# Patient Record
Sex: FEMALE | Race: WHITE | NOT HISPANIC OR LATINO | Employment: UNEMPLOYED | URBAN - METROPOLITAN AREA
[De-identification: names, ages, dates, MRNs, and addresses within clinical notes are randomized per-mention and may not be internally consistent; named-entity substitution may affect disease eponyms.]

---

## 2023-01-30 ENCOUNTER — OFFICE VISIT (OUTPATIENT)
Dept: INTERNAL MEDICINE | Facility: CLINIC | Age: 38
End: 2023-01-30
Payer: COMMERCIAL

## 2023-01-30 VITALS
OXYGEN SATURATION: 98 % | HEART RATE: 87 BPM | HEIGHT: 60 IN | TEMPERATURE: 98 F | BODY MASS INDEX: 43.55 KG/M2 | RESPIRATION RATE: 20 BRPM | WEIGHT: 221.81 LBS | SYSTOLIC BLOOD PRESSURE: 110 MMHG | DIASTOLIC BLOOD PRESSURE: 60 MMHG

## 2023-01-30 DIAGNOSIS — E66.01 MORBID OBESITY WITH BMI OF 40.0-44.9, ADULT: ICD-10-CM

## 2023-01-30 DIAGNOSIS — Z00.00 ANNUAL PHYSICAL EXAM: Primary | ICD-10-CM

## 2023-01-30 PROCEDURE — 3078F PR MOST RECENT DIASTOLIC BLOOD PRESSURE < 80 MM HG: ICD-10-PCS | Mod: CPTII,S$GLB,, | Performed by: INTERNAL MEDICINE

## 2023-01-30 PROCEDURE — 1160F PR REVIEW ALL MEDS BY PRESCRIBER/CLIN PHARMACIST DOCUMENTED: ICD-10-PCS | Mod: CPTII,S$GLB,, | Performed by: INTERNAL MEDICINE

## 2023-01-30 PROCEDURE — 3078F DIAST BP <80 MM HG: CPT | Mod: CPTII,S$GLB,, | Performed by: INTERNAL MEDICINE

## 2023-01-30 PROCEDURE — 3008F PR BODY MASS INDEX (BMI) DOCUMENTED: ICD-10-PCS | Mod: CPTII,S$GLB,, | Performed by: INTERNAL MEDICINE

## 2023-01-30 PROCEDURE — 1159F MED LIST DOCD IN RCRD: CPT | Mod: CPTII,S$GLB,, | Performed by: INTERNAL MEDICINE

## 2023-01-30 PROCEDURE — 99385 PR PREVENTIVE VISIT,NEW,18-39: ICD-10-PCS | Mod: S$GLB,,, | Performed by: INTERNAL MEDICINE

## 2023-01-30 PROCEDURE — 99999 PR PBB SHADOW E&M-EST. PATIENT-LVL IV: CPT | Mod: PBBFAC,,, | Performed by: INTERNAL MEDICINE

## 2023-01-30 PROCEDURE — 3074F PR MOST RECENT SYSTOLIC BLOOD PRESSURE < 130 MM HG: ICD-10-PCS | Mod: CPTII,S$GLB,, | Performed by: INTERNAL MEDICINE

## 2023-01-30 PROCEDURE — 3008F BODY MASS INDEX DOCD: CPT | Mod: CPTII,S$GLB,, | Performed by: INTERNAL MEDICINE

## 2023-01-30 PROCEDURE — 1159F PR MEDICATION LIST DOCUMENTED IN MEDICAL RECORD: ICD-10-PCS | Mod: CPTII,S$GLB,, | Performed by: INTERNAL MEDICINE

## 2023-01-30 PROCEDURE — 1160F RVW MEDS BY RX/DR IN RCRD: CPT | Mod: CPTII,S$GLB,, | Performed by: INTERNAL MEDICINE

## 2023-01-30 PROCEDURE — 99999 PR PBB SHADOW E&M-EST. PATIENT-LVL IV: ICD-10-PCS | Mod: PBBFAC,,, | Performed by: INTERNAL MEDICINE

## 2023-01-30 PROCEDURE — 3074F SYST BP LT 130 MM HG: CPT | Mod: CPTII,S$GLB,, | Performed by: INTERNAL MEDICINE

## 2023-01-30 PROCEDURE — 99385 PREV VISIT NEW AGE 18-39: CPT | Mod: S$GLB,,, | Performed by: INTERNAL MEDICINE

## 2023-01-30 NOTE — PROGRESS NOTES
Valerie Guillen  37 y.o. White female  [id number]    Chief Complaint:  Chief Complaint   Patient presents with    Establish [facility]    Annual Exam       History of Present Illness:  Presents to the clinic to establish care and for a check up.   She has been having chest discomfort and heart palpitations. She notices it when she drinks alcohol and eats sweets. She denies a history of heart disease, hypertension or diabetes.     Laboratory   Lab Results   Component Value Date    WBC 9.18 2021    HGB 12.0 2021    HCT 35.7 (A) 2021     2021     Review of Systems   Constitutional:  Negative for fever and weight loss.   Respiratory:  Negative for cough and shortness of breath.    Cardiovascular:  Positive for chest pain and palpitations. Negative for leg swelling.   Gastrointestinal:  Negative for abdominal pain, constipation and diarrhea.   Genitourinary:  Negative for dysuria.   Neurological:  Negative for dizziness and headaches.     The following were reviewed: Active problem list, medication list, allergies, family history, social history, and Health Maintenance.     History:  History reviewed. No pertinent past medical history.  Past Surgical History:   Procedure Laterality Date     SECTION      GALLBLADDER SURGERY       Family History   Problem Relation Age of Onset    Lung cancer Maternal Grandfather     Lung cancer Paternal Grandfather      Social History     Socioeconomic History    Marital status:    Tobacco Use    Smoking status: Never    Smokeless tobacco: Never   Substance and Sexual Activity    Alcohol use: Not Currently    Drug use: Never    Sexual activity: Yes     Partners: Male     There is no problem list on file for this patient.    Review of patient's allergies indicates:  No Known Allergies    Health Maintenance  Health Maintenance Topics with due status: Not Due       Topic Last Completion Date    Cervical Cancer Screening 2022     Health Maintenance  Due   Topic Date Due    Lipid Panel  Never done    TETANUS VACCINE  Never done    Hemoglobin A1c (Diabetic Prevention Screening)  Never done       Medications:  No current outpatient medications on file prior to visit.     No current facility-administered medications on file prior to visit.       Medications have been reviewed and reconciled with patient at visit today.    Exam:  Vitals:    01/30/23 0939   BP: 110/60   Pulse: 87   Resp: 20   Temp: 98.1 °F (36.7 °C)     Weight: 100.6 kg (221 lb 12.5 oz)   Body mass index is 43.31 kg/m².      Physical Exam  Vitals reviewed.   Constitutional:       General: She is not in acute distress.     Appearance: She is well-developed. She is not ill-appearing.   Eyes:      General: No scleral icterus.  Cardiovascular:      Rate and Rhythm: Normal rate and regular rhythm.      Heart sounds: Normal heart sounds.   Pulmonary:      Effort: Pulmonary effort is normal. No respiratory distress.      Breath sounds: Normal breath sounds. No wheezing or rales.   Chest:      Chest wall: No tenderness.   Abdominal:      General: Bowel sounds are normal.      Palpations: Abdomen is soft.   Musculoskeletal:      Right lower leg: No edema.      Left lower leg: No edema.   Skin:     General: Skin is warm and dry.   Neurological:      Mental Status: She is alert and oriented to person, place, and time.   Psychiatric:         Behavior: Behavior normal.       Assessment:  The primary encounter diagnosis was Annual physical exam. A diagnosis of Morbid obesity with BMI of 40.0-44.9, adult was also pertinent to this visit.    Plan:  Annual physical exam  -     Counseled regarding age appropriate screenings and immunizations  -     Counseled regarding lifestyle modifications  -     Lipid Panel; Future; Expected date: 07/27/2023  -     Hemoglobin A1C; Future; Expected date: 04/27/2023  -     CBC Auto Differential; Future; Expected date: 01/30/2023  -     Comprehensive Metabolic Panel; Future; Expected  date: 01/30/2023  -     TSH; Future  -     SCHEDULED EKG 12-LEAD (to [place]); Future    Morbid obesity with BMI of 40.0-44.9, adult  -     Lifestyle modifications     Schedule labs.

## 2023-02-24 ENCOUNTER — PATIENT MESSAGE (OUTPATIENT)
Dept: INTERNAL MEDICINE | Facility: CLINIC | Age: 38
End: 2023-02-24
Payer: COMMERCIAL

## 2023-02-27 ENCOUNTER — HOSPITAL ENCOUNTER (OUTPATIENT)
Dept: CARDIOLOGY | Facility: HOSPITAL | Age: 38
Discharge: HOME | End: 2023-02-27
Payer: COMMERCIAL

## 2023-02-27 DIAGNOSIS — Z00.00 ANNUAL PHYSICAL EXAM: ICD-10-CM

## 2023-02-27 PROCEDURE — 93005 ELECTROCARDIOGRAM TRACING: CPT

## 2023-02-27 PROCEDURE — 93010 EKG 12-LEAD: ICD-10-PCS | Mod: ,,, | Performed by: INTERNAL MEDICINE

## 2023-02-27 PROCEDURE — 93010 ELECTROCARDIOGRAM REPORT: CPT | Mod: ,,, | Performed by: INTERNAL MEDICINE

## 2023-03-02 DIAGNOSIS — R00.2 PALPITATIONS: Primary | ICD-10-CM

## 2023-03-09 ENCOUNTER — OFFICE VISIT (OUTPATIENT)
Dept: CARDIOLOGY | Facility: CLINIC | Age: 38
End: 2023-03-09
Payer: COMMERCIAL

## 2023-03-09 VITALS
BODY MASS INDEX: 42.5 KG/M2 | SYSTOLIC BLOOD PRESSURE: 112 MMHG | WEIGHT: 217.63 LBS | OXYGEN SATURATION: 96 % | DIASTOLIC BLOOD PRESSURE: 64 MMHG | HEART RATE: 78 BPM

## 2023-03-09 DIAGNOSIS — E66.01 CLASS 3 SEVERE OBESITY DUE TO EXCESS CALORIES WITHOUT SERIOUS COMORBIDITY WITH BODY MASS INDEX (BMI) OF 40.0 TO 44.9 IN ADULT: ICD-10-CM

## 2023-03-09 DIAGNOSIS — R07.89 OTHER CHEST PAIN: Primary | ICD-10-CM

## 2023-03-09 DIAGNOSIS — R00.2 PALPITATIONS: ICD-10-CM

## 2023-03-09 DIAGNOSIS — R00.2 PALPITATION: ICD-10-CM

## 2023-03-09 PROCEDURE — 3044F HG A1C LEVEL LT 7.0%: CPT | Mod: CPTII,S$GLB,, | Performed by: INTERNAL MEDICINE

## 2023-03-09 PROCEDURE — 1160F RVW MEDS BY RX/DR IN RCRD: CPT | Mod: CPTII,S$GLB,, | Performed by: INTERNAL MEDICINE

## 2023-03-09 PROCEDURE — 99204 OFFICE O/P NEW MOD 45 MIN: CPT | Mod: S$GLB,,, | Performed by: INTERNAL MEDICINE

## 2023-03-09 PROCEDURE — 3078F DIAST BP <80 MM HG: CPT | Mod: CPTII,S$GLB,, | Performed by: INTERNAL MEDICINE

## 2023-03-09 PROCEDURE — 99999 PR PBB SHADOW E&M-EST. PATIENT-LVL IV: ICD-10-PCS | Mod: PBBFAC,,, | Performed by: INTERNAL MEDICINE

## 2023-03-09 PROCEDURE — 99204 PR OFFICE/OUTPT VISIT, NEW, LEVL IV, 45-59 MIN: ICD-10-PCS | Mod: S$GLB,,, | Performed by: INTERNAL MEDICINE

## 2023-03-09 PROCEDURE — 3074F PR MOST RECENT SYSTOLIC BLOOD PRESSURE < 130 MM HG: ICD-10-PCS | Mod: CPTII,S$GLB,, | Performed by: INTERNAL MEDICINE

## 2023-03-09 PROCEDURE — 1159F MED LIST DOCD IN RCRD: CPT | Mod: CPTII,S$GLB,, | Performed by: INTERNAL MEDICINE

## 2023-03-09 PROCEDURE — 3044F PR MOST RECENT HEMOGLOBIN A1C LEVEL <7.0%: ICD-10-PCS | Mod: CPTII,S$GLB,, | Performed by: INTERNAL MEDICINE

## 2023-03-09 PROCEDURE — 3008F BODY MASS INDEX DOCD: CPT | Mod: CPTII,S$GLB,, | Performed by: INTERNAL MEDICINE

## 2023-03-09 PROCEDURE — 3008F PR BODY MASS INDEX (BMI) DOCUMENTED: ICD-10-PCS | Mod: CPTII,S$GLB,, | Performed by: INTERNAL MEDICINE

## 2023-03-09 PROCEDURE — 3078F PR MOST RECENT DIASTOLIC BLOOD PRESSURE < 80 MM HG: ICD-10-PCS | Mod: CPTII,S$GLB,, | Performed by: INTERNAL MEDICINE

## 2023-03-09 PROCEDURE — 3074F SYST BP LT 130 MM HG: CPT | Mod: CPTII,S$GLB,, | Performed by: INTERNAL MEDICINE

## 2023-03-09 PROCEDURE — 1160F PR REVIEW ALL MEDS BY PRESCRIBER/CLIN PHARMACIST DOCUMENTED: ICD-10-PCS | Mod: CPTII,S$GLB,, | Performed by: INTERNAL MEDICINE

## 2023-03-09 PROCEDURE — 1159F PR MEDICATION LIST DOCUMENTED IN MEDICAL RECORD: ICD-10-PCS | Mod: CPTII,S$GLB,, | Performed by: INTERNAL MEDICINE

## 2023-03-09 PROCEDURE — 99999 PR PBB SHADOW E&M-EST. PATIENT-LVL IV: CPT | Mod: PBBFAC,,, | Performed by: INTERNAL MEDICINE

## 2023-03-09 RX ORDER — AMOXICILLIN 875 MG/1
875 TABLET, FILM COATED ORAL 2 TIMES DAILY
COMMUNITY
Start: 2022-12-01 | End: 2024-01-04

## 2023-03-09 RX ORDER — OXYCODONE HYDROCHLORIDE 5 MG/1
TABLET ORAL
COMMUNITY
Start: 2022-03-23 | End: 2024-01-04

## 2023-03-09 RX ORDER — IBUPROFEN 600 MG/1
TABLET ORAL
COMMUNITY
Start: 2022-03-23 | End: 2024-01-04

## 2023-03-09 RX ORDER — ACETAMINOPHEN 500 MG
TABLET ORAL
COMMUNITY
Start: 2022-03-31 | End: 2024-01-04

## 2023-03-09 NOTE — PROGRESS NOTES
Subjective:   Patient ID:  Valerie Guillen is a 37 y.o. female who presents for evaluation of No chief complaint on file.      38 yo female, referred for chest pain. 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  C/o chest pain and tightness 3 months. Few times a month. Occurred at night on the bed. Could last for hours. Diffuse both chests  C/o palpitation over 100 bpm some time lasted for hours, post alcohol or dessert. No dizziness and faint.   No smoking. Occasional drinking. No energy drink. Some coffee  F/h premature CAD and SCD  Ekg NSR nonspecific STT change        History reviewed. No pertinent past medical history.    Past Surgical History:   Procedure Laterality Date     SECTION      GALLBLADDER SURGERY         Social History     Tobacco Use    Smoking status: Never    Smokeless tobacco: Never   Substance Use Topics    Alcohol use: Not Currently    Drug use: Never       Family History   Problem Relation Age of Onset    Lung cancer Maternal Grandfather     Lung cancer Paternal Grandfather        Review of Systems   Constitutional: Negative for decreased appetite, diaphoresis, fever, malaise/fatigue and night sweats.   HENT:  Negative for nosebleeds.    Eyes:  Negative for blurred vision and double vision.   Cardiovascular:  Positive for chest pain and palpitations. Negative for claudication, dyspnea on exertion, irregular heartbeat, leg swelling, near-syncope, orthopnea, paroxysmal nocturnal dyspnea and syncope.   Respiratory:  Negative for cough, shortness of breath, sleep disturbances due to breathing, snoring, sputum production and wheezing.    Endocrine: Negative for cold intolerance and polyuria.   Hematologic/Lymphatic: Does not bruise/bleed easily.   Skin:  Negative for rash.   Musculoskeletal:  Negative for back pain, falls, joint pain, joint swelling and neck pain.   Gastrointestinal:  Negative for abdominal pain, heartburn, nausea and vomiting.   Genitourinary:  Negative for dysuria, frequency and hematuria.    Neurological:  Negative for difficulty with concentration, dizziness, focal weakness, headaches, light-headedness, numbness, seizures and weakness.   Psychiatric/Behavioral:  Negative for depression, memory loss and substance abuse. The patient does not have insomnia.    Allergic/Immunologic: Negative for HIV exposure and hives.     Objective:   Physical Exam  HENT:      Head: Normocephalic.   Eyes:      Pupils: Pupils are equal, round, and reactive to light.   Neck:      Thyroid: No thyromegaly.      Vascular: Normal carotid pulses. No carotid bruit or JVD.   Cardiovascular:      Rate and Rhythm: Normal rate and regular rhythm. No extrasystoles are present.     Chest Wall: PMI is not displaced.      Pulses: Normal pulses.      Heart sounds: Normal heart sounds. No murmur heard.    No gallop. No S3 sounds.   Pulmonary:      Effort: No respiratory distress.      Breath sounds: Normal breath sounds. No stridor.   Abdominal:      General: Bowel sounds are normal.      Palpations: Abdomen is soft.      Tenderness: There is no abdominal tenderness. There is no rebound.   Musculoskeletal:         General: Normal range of motion.   Skin:     Findings: No rash.   Neurological:      Mental Status: She is alert and oriented to person, place, and time.   Psychiatric:         Behavior: Behavior normal.       Lab Results   Component Value Date    CHOL 136 02/27/2023     Lab Results   Component Value Date    HDL 48 02/27/2023     Lab Results   Component Value Date    LDLCALC 79.0 02/27/2023     Lab Results   Component Value Date    TRIG 45 02/27/2023     Lab Results   Component Value Date    CHOLHDL 35.3 02/27/2023       Chemistry        Component Value Date/Time     02/27/2023 0922    K 4.4 02/27/2023 0922     02/27/2023 0922    CO2 23 02/27/2023 0922    BUN 11 02/27/2023 0922    CREATININE 0.7 02/27/2023 0922    GLU 85 02/27/2023 0922        Component Value Date/Time    CALCIUM 9.5 02/27/2023 0922    ALKPHOS 54  (L) 02/27/2023 0922    AST 15 02/27/2023 0922    ALT 18 02/27/2023 0922    BILITOT 0.4 02/27/2023 0922          Lab Results   Component Value Date    HGBA1C 5.1 02/27/2023     Lab Results   Component Value Date    TSH 1.794 02/27/2023     No results found for: INR, PROTIME  Lab Results   Component Value Date    WBC 6.41 02/27/2023    HGB 13.9 02/27/2023    HCT 42.3 02/27/2023    MCV 87 02/27/2023     02/27/2023     BNP  @LABRCNTIP(BNP,BNPTRIAGEBLO)@  CrCl cannot be calculated (Patient's most recent lab result is older than the maximum 7 days allowed.).  No results found in the last 24 hours.  No results found in the last 24 hours.  No results found in the last 24 hours.    Assessment:      1. Other chest pain    2. Palpitations    3. Palpitation    4. Class 3 severe obesity due to excess calories without serious comorbidity with body mass index (BMI) of 40.0 to 44.9 in adult        Plan:   Echo and ETT for chest pain  BARDY for palpitation  Weight loss    Counseled DASH  Recommend heart-healthy diet, weight control and regular exercise.  Alessandra. Risk modification.   I have reviewed all pertinent labs and cardiac studies independently. Plans and recommendations have been formulated under my direct supervision. All questions answered and patient voiced understanding.   If symptoms persist go to the ED  F/u with pcp

## 2023-03-30 ENCOUNTER — TELEPHONE (OUTPATIENT)
Dept: CARDIOLOGY | Facility: HOSPITAL | Age: 38
End: 2023-03-30
Payer: COMMERCIAL

## 2023-10-11 ENCOUNTER — TELEPHONE (OUTPATIENT)
Dept: DERMATOLOGY | Facility: CLINIC | Age: 38
End: 2023-10-11
Payer: COMMERCIAL

## 2023-10-11 NOTE — TELEPHONE ENCOUNTER
Called and spoke to patient. Pt would like to take daughter's upcoming appointment. Appointment scheduled.   ----- Message from Corie Milan sent at 10/11/2023 10:28 AM CDT -----  Regarding: Soon Appt  Contact: Valerie  .Type:  Sooner Apoointment Request    Caller is requesting a sooner appointment.  Caller declined first available appointment listed below.  Caller will not accept being placed on the waitlist and is requesting a message be sent to doctor.  Name of Caller: Valerie  When is the first available appointment?  Symptoms:rash   Would the patient rather a call back or a response via [facility]? call  Best Call Back Number: [phone] (home)    Additional Information:  Valerie would like to come in the office in place of her daughter durga due to a rash.

## 2023-10-16 ENCOUNTER — OFFICE VISIT (OUTPATIENT)
Dept: DERMATOLOGY | Facility: CLINIC | Age: 38
End: 2023-10-16
Payer: COMMERCIAL

## 2023-10-16 DIAGNOSIS — L71.0 PERIORAL DERMATITIS: Primary | ICD-10-CM

## 2023-10-16 DIAGNOSIS — L82.1 VERRUCOUS PAPULE: ICD-10-CM

## 2023-10-16 PROCEDURE — 99204 PR OFFICE/OUTPT VISIT, NEW, LEVL IV, 45-59 MIN: ICD-10-PCS | Mod: 25,S$GLB,, | Performed by: PHYSICIAN ASSISTANT

## 2023-10-16 PROCEDURE — 1159F MED LIST DOCD IN RCRD: CPT | Mod: CPTII,S$GLB,, | Performed by: PHYSICIAN ASSISTANT

## 2023-10-16 PROCEDURE — 1159F PR MEDICATION LIST DOCUMENTED IN MEDICAL RECORD: ICD-10-PCS | Mod: CPTII,S$GLB,, | Performed by: PHYSICIAN ASSISTANT

## 2023-10-16 PROCEDURE — 99204 OFFICE O/P NEW MOD 45 MIN: CPT | Mod: 25,S$GLB,, | Performed by: PHYSICIAN ASSISTANT

## 2023-10-16 PROCEDURE — 3044F HG A1C LEVEL LT 7.0%: CPT | Mod: CPTII,S$GLB,, | Performed by: PHYSICIAN ASSISTANT

## 2023-10-16 PROCEDURE — 99999 PR PBB SHADOW E&M-EST. PATIENT-LVL II: CPT | Mod: PBBFAC,,, | Performed by: PHYSICIAN ASSISTANT

## 2023-10-16 PROCEDURE — 1160F PR REVIEW ALL MEDS BY PRESCRIBER/CLIN PHARMACIST DOCUMENTED: ICD-10-PCS | Mod: CPTII,S$GLB,, | Performed by: PHYSICIAN ASSISTANT

## 2023-10-16 PROCEDURE — 3044F PR MOST RECENT HEMOGLOBIN A1C LEVEL <7.0%: ICD-10-PCS | Mod: CPTII,S$GLB,, | Performed by: PHYSICIAN ASSISTANT

## 2023-10-16 PROCEDURE — 99999 PR PBB SHADOW E&M-EST. PATIENT-LVL II: ICD-10-PCS | Mod: PBBFAC,,, | Performed by: PHYSICIAN ASSISTANT

## 2023-10-16 PROCEDURE — 1160F RVW MEDS BY RX/DR IN RCRD: CPT | Mod: CPTII,S$GLB,, | Performed by: PHYSICIAN ASSISTANT

## 2023-10-16 RX ORDER — METRONIDAZOLE 7.5 MG/G
CREAM TOPICAL 2 TIMES DAILY
Qty: 45 G | Refills: 1 | Status: SHIPPED | OUTPATIENT
Start: 2023-10-16 | End: 2024-10-15

## 2023-10-16 NOTE — PATIENT INSTRUCTIONS
Start broad spectrum sunscreen with SPF 30 +and zinc oxide and/or titanium dioxide.  Use sunscreen daily.      Cetaphil Gentle Cleanser  Dove Sensitive Skin Bar

## 2023-10-16 NOTE — PROGRESS NOTES
"  Subjective:      Patient ID:  Valerie Guillen is a 37 y.o. female who presents for   Chief Complaint   Patient presents with    skin lesion      X years. Tx over the counter wart wart treatment.     Dry Skin     Underneath nostrils. X years. Sx dry skin, tx none.      36 yo female, here for c/o rash of face (below nostrils), pretty persistent but seems to be slowly getting worse. +Redness, bumpiness, scaling. Previous tx: otc moisturizer, antifungal cream (no help).  Duration: several years. Denies PsO, but endorses some intermittent "fungal rashes" of skin folds (abdomen).    C/o wart like bump of left posterior ankle. Previous tx: otc wart spray.        Review of Systems   Constitutional:  Negative for fever and chills.   Gastrointestinal:  Negative for nausea and vomiting.   Skin:  Positive for rash, dry skin and activity-related sunscreen use. Negative for itching, sun sensitivity, daily sunscreen use, recent sunburn, dry lips and abscesses.   Hematologic/Lymphatic: Does not bruise/bleed easily.       Objective:   Physical Exam   Constitutional: She appears well-developed and well-nourished. No distress.   Neurological: She is alert and oriented to person, place, and time. She is not disoriented.   Psychiatric: She has a normal mood and affect.   Skin:   Areas Examined (abnormalities noted in diagram):   Head / Face Inspection Performed  Neck Inspection Performed  Chest / Axilla Inspection Performed  Back Inspection Performed  RUE Inspected  LUE Inspection Performed                 Diagram Legend     Erythematous scaling macule/papule c/w actinic keratosis       Vascular papule c/w angioma      Pigmented verrucoid papule/plaque c/w seborrheic keratosis      Yellow umbilicated papule c/w sebaceous hyperplasia      Irregularly shaped tan macule c/w lentigo     1-2 mm smooth white papules consistent with Milia      Movable subcutaneous cyst with punctum c/w epidermal inclusion cyst      Subcutaneous movable cyst " c/w pilar cyst      Firm pink to brown papule c/w dermatofibroma      Pedunculated fleshy papule(s) c/w skin tag(s)      Evenly pigmented macule c/w junctional nevus     Mildly variegated pigmented, slightly irregular-bordered macule c/w mildly atypical nevus      Flesh colored to evenly pigmented papule c/w intradermal nevus       Pink pearly papule/plaque c/w basal cell carcinoma      Erythematous hyperkeratotic cursted plaque c/w SCC      Surgical scar with no sign of skin cancer recurrence      Open and closed comedones      Inflammatory papules and pustules      Verrucoid papule consistent consistent with wart     Erythematous eczematous patches and plaques     Dystrophic onycholytic nail with subungual debris c/w onychomycosis     Umbilicated papule    Erythematous-base heme-crusted tan verrucoid plaque consistent with inflamed seborrheic keratosis     Erythematous Silvery Scaling Plaque c/w Psoriasis     See annotation      Assessment / Plan:        Perioral dermatitis  -     metronidazole 0.75% (METROCREAM) 0.75 % Crea; Apply topically 2 (two) times daily. For perioral dermatitis.  Dispense: 45 g; Refill: 1  Discussed dx, tx options, need for cosmetics holiday. Encouraged mineral spf such as Elta MD UV Physical. Advised may take 2 months to improve w/treatment and discussed risk o f recurrence.     Verrucous papule  Ddx: VV vs. PN vs. Other benign  Agree to trial of cryo today. Cryosurgery procedure note:    After risks, benefits, and alternatives discussed, including blistering, pain, scar, recurrence, allergy to anesthesia (if given), hyper- and hypopigmentation, verbal consent from the patient is obtained.  Liquid nitrogen cryosurgery is applied to 1 verruca  as detailed in the physical exam, to produce a freeze injury. 2 consecutive freeze thaw cycles are applied to each verruca. The patient is aware that blisters (possibly blood blisters) may form.         Follow up in about 3 months (around 1/16/2024)  for rosacea.

## 2024-01-04 ENCOUNTER — ON-DEMAND VIRTUAL (OUTPATIENT)
Dept: URGENT CARE | Facility: CLINIC | Age: 39
End: 2024-01-04
Payer: COMMERCIAL

## 2024-01-04 DIAGNOSIS — N89.8 VAGINAL ITCHING: Primary | ICD-10-CM

## 2024-01-04 PROCEDURE — 99213 OFFICE O/P EST LOW 20 MIN: CPT | Mod: 95,S$GLB,, | Performed by: NURSE PRACTITIONER

## 2024-01-04 RX ORDER — FLUCONAZOLE 150 MG/1
150 TABLET ORAL DAILY
Qty: 2 TABLET | Refills: 0 | Status: SHIPPED | OUTPATIENT
Start: 2024-01-04 | End: 2024-01-06

## 2024-01-04 NOTE — PROGRESS NOTES
Subjective:      Patient ID: Valerie Guillen is a 38 y.o. female.    Vitals:  vitals were not taken for this visit.     Chief Complaint: Vaginal Itching      Visit Type: TELE AUDIOVISUAL    Present with the patient at the time of consultation: TELEMED PRESENT WITH PATIENT: None    No past medical history on file.  Past Surgical History:   Procedure Laterality Date     SECTION      GALLBLADDER SURGERY       Review of patient's allergies indicates:  No Known Allergies  Current Outpatient Medications on File Prior to Visit   Medication Sig Dispense Refill    acetaminophen (TYLENOL) 500 MG tablet Take by mouth.      amoxicillin (AMOXIL) 875 MG tablet Take 875 mg by mouth 2 (two) times daily.      ibuprofen (ADVIL,MOTRIN) 600 MG tablet Take by mouth.      metronidazole 0.75% (METROCREAM) 0.75 % Crea Apply topically 2 (two) times daily. For perioral dermatitis. 45 g 1    oxyCODONE (ROXICODONE) 5 MG immediate release tablet Take by mouth.       No current facility-administered medications on file prior to visit.     Family History   Problem Relation Age of Onset    Lung cancer Maternal Grandfather     Lung cancer Paternal Grandfather        Medications Ordered                [id number] [place] - [address] [facility]   [address] [place] [address]    Telephone: [phone]   Fax: [phone]   Hours: Not open 24 hours                         E-Prescribed (1 of 1)              fluconazole (DIFLUCAN) 150 MG Tab    Sig: Take 1 tablet (150 mg total) by mouth once daily. Take one dose today, second dose on . for 2 doses       Start: 24     Quantity: 2 tablet Refills: 0                           Ohs Peq Odvv Intake    2024 10:14 AM CST - Filed by Patient   Describe your reason for todays visit Yeast infection - vaginal itching   What is your current physical address in the event of a medical emergency? [address];  [place]  [address]   Are you able to take your vital signs? No   Please attach any relevant images or files          Pt is a 38 yr old female calling from [place], [place] with c/o vaginal itching and painful intercourse.   She states this is her 3rd yeast infection in 6 months.     Vaginal Itching  The patient's primary symptoms include genital itching and vaginal discharge. This is a new problem. The current episode started yesterday. The problem has been gradually worsening. The pain is mild. She is not pregnant. Associated symptoms include dysuria (mild started a little while ago). Nothing aggravates the symptoms. She is sexually active. No, her partner does not have an STD. She uses oral contraceptives for contraception. Her menstrual history has been regular.     Genitourinary:  Positive for dysuria (mild started a little while ago), vaginal discharge and painful intercourse.      Objective:   The physical exam was conducted virtually.  Physical Exam   Constitutional: She is oriented to person, place, and time.   HENT:   Head: Normocephalic and atraumatic.   Eyes: Conjunctivae are normal. Pupils are equal, round, and reactive to light. Extraocular movement intact   Pulmonary/Chest: Effort normal.   Abdominal: Normal appearance.   Neurological: no focal deficit. She is alert and oriented to person, place, and time.   Psychiatric: Her behavior is normal. Mood, judgment and thought content normal.     Assessment:     1. Vaginal itching  - fluconazole (DIFLUCAN) 150 MG Tab; Take 1 tablet (150 mg total) by mouth once daily. Take one dose today, second dose on Sunday. for 2 doses  Dispense: 2 tablet; Refill: 0         Plan:       Vaginal itching  -     fluconazole (DIFLUCAN) 150 MG Tab; Take 1 tablet (150 mg total) by mouth once daily. Take one dose today, second dose on Sunday. for 2 doses  Dispense: 2 tablet; Refill: 0      Patient Instructions   Vaginal Yeast Infection Discharge Instructions   About this topic   Yeast  infections are caused by a germ called a fungus. The germs live almost everywhere on your body. The germs grow best in dark moist areas of your body like the vagina. Yeast germs also grow on your skin. Most often, your immune system can control the amount of yeast and you stay healthy. If you are sick, the yeast can multiply and cause an infection. An infection in your vagina can cause very bad itching. You may also have a discharge that looks like cottage cheese. You are more likely to get a yeast infection if you are:   On steroids or antibiotics  Pregnant  A diabetic or have high blood sugar  On birth control pills  A woman who uses feminine washes or douches  Not taking good care of your skin and keeping your skin clean  A person with problems with the immune system  What care is needed at home?   Ask your doctor what you need to do when you go home. Make sure you ask questions if you do not understand what you need to do.  Practice good hygiene. Wash often with soap and water. Take a shower instead of a bath. Avoid bubble baths. Pat dry with a clean towel.  Keep moist areas of the body clean, cool, and dry.  Do not use douches or feminine sprays.  Wear cotton underwear. Avoid tight-fitting pants or shorts.  Change your wet bathing suit or damp workout clothes as soon as possible.  Avoid sexual contact until both you and your partner are free of infection.  Always wipe from front to back after going to the restroom.  What follow-up care is needed?   Your doctor may ask you to make visits to the office to check on your progress. Be sure to keep your visits.  What drugs may be needed?   The doctor may order drugs to:  Help itching  Fight an infection  Take your drugs as ordered. Do not stop until your doctor tells you to do so.  Will physical activity be limited?   Pain and itching may cause you to limit your activities for a short while.  What changes to diet are needed?   If you have diabetes, control your blood  "sugar.  Ask your doctor about eating yogurt "with active cultures" if on antibiotic therapy.  Avoid beer, wine, and mixed drinks (alcohol) when taking drugs to treat a yeast infection.  What problems could happen?   Long-term infection or the infection comes back  Another infection with a different kind of germ  When do I need to call the doctor?   Signs of infection such as a fever of 100.4°F (38°C) or higher, chills, pain with passing urine, or mouth sores  An itching, red, moist skin rash, or cracks in the skin of the vagina  Pain in your mouth or throat with white patches (thrush)  Itchy vaginal discharge  You are not feeling better in 2 to 3 days or you are feeling worse  Teach Back: Helping You Understand   The Teach Back Method helps you understand the information we are giving you. After you talk with the staff, tell them in your own words what you learned. This helps to make sure the staff has described each thing clearly. It also helps to explain things that may have been confusing. Before going home, make sure you can do these:  I can tell you about my condition.  I can tell you what may help keep me from getting a vaginal infection again.  I can tell you what I will do if I have a fever, chills, itching, a rash, or vaginal discharge.  Where can I learn more?   Centers for Disease Control and Prevention  https://www.cdc.gov/fungal/diseases/candidiasis/genital/index.html   FamilyDoctor.org  http://familydoctor.org/familydoctor/en/diseases-conditions/yeast-infections.html   Women's Health  http://www.womenshealth.gov/publications/our-publications/fact-sheet/vaginal-yeast-infections.html   Last Reviewed Date   2019-11-26  Consumer Information Use and Disclaimer   This information is not specific medical advice and does not replace information you receive from your health care provider. This is only a brief summary of general information. It does NOT include all information about conditions, illnesses, " injuries, tests, procedures, treatments, therapies, discharge instructions or life-style choices that may apply to you. You must talk with your health care provider for complete information about your health and treatment options. This information should not be used to decide whether or not to accept your health care providers advice, instructions or recommendations. Only your health care provider has the knowledge and training to provide advice that is right for you.  Copyright   Copyright © 2021 [employer] and its affiliates and/or licensors. All rights reserved.

## 2024-01-04 NOTE — PATIENT INSTRUCTIONS
Vaginal Yeast Infection Discharge Instructions   About this topic   Yeast infections are caused by a germ called a fungus. The germs live almost everywhere on your body. The germs grow best in dark moist areas of your body like the vagina. Yeast germs also grow on your skin. Most often, your immune system can control the amount of yeast and you stay healthy. If you are sick, the yeast can multiply and cause an infection. An infection in your vagina can cause very bad itching. You may also have a discharge that looks like cottage cheese. You are more likely to get a yeast infection if you are:   On steroids or antibiotics  Pregnant  A diabetic or have high blood sugar  On birth control pills  A woman who uses feminine washes or douches  Not taking good care of your skin and keeping your skin clean  A person with problems with the immune system  What care is needed at home?   Ask your doctor what you need to do when you go home. Make sure you ask questions if you do not understand what you need to do.  Practice good hygiene. Wash often with soap and water. Take a shower instead of a bath. Avoid bubble baths. Pat dry with a clean towel.  Keep moist areas of the body clean, cool, and dry.  Do not use douches or feminine sprays.  Wear cotton underwear. Avoid tight-fitting pants or shorts.  Change your wet bathing suit or damp workout clothes as soon as possible.  Avoid sexual contact until both you and your partner are free of infection.  Always wipe from front to back after going to the restroom.  What follow-up care is needed?   Your doctor may ask you to make visits to the office to check on your progress. Be sure to keep your visits.  What drugs may be needed?   The doctor may order drugs to:  Help itching  Fight an infection  Take your drugs as ordered. Do not stop until your doctor tells you to do so.  Will physical activity be limited?   Pain and itching may cause you to limit your activities for a short  "while.  What changes to diet are needed?   If you have diabetes, control your blood sugar.  Ask your doctor about eating yogurt "with active cultures" if on antibiotic therapy.  Avoid beer, wine, and mixed drinks (alcohol) when taking drugs to treat a yeast infection.  What problems could happen?   Long-term infection or the infection comes back  Another infection with a different kind of germ  When do I need to call the doctor?   Signs of infection such as a fever of 100.4°F (38°C) or higher, chills, pain with passing urine, or mouth sores  An itching, red, moist skin rash, or cracks in the skin of the vagina  Pain in your mouth or throat with white patches (thrush)  Itchy vaginal discharge  You are not feeling better in 2 to 3 days or you are feeling worse  Teach Back: Helping You Understand   The Teach Back Method helps you understand the information we are giving you. After you talk with the staff, tell them in your own words what you learned. This helps to make sure the staff has described each thing clearly. It also helps to explain things that may have been confusing. Before going home, make sure you can do these:  I can tell you about my condition.  I can tell you what may help keep me from getting a vaginal infection again.  I can tell you what I will do if I have a fever, chills, itching, a rash, or vaginal discharge.  Where can I learn more?   Centers for Disease Control and Prevention  https://www.cdc.gov/fungal/diseases/candidiasis/genital/index.html   FamilyDoctor.org  http://familydoctor.org/familydoctor/en/diseases-conditions/yeast-infections.html   Women's Health  http://www.womenshealth.gov/publications/our-publications/fact-sheet/vaginal-yeast-infections.html   Last Reviewed Date   2019-11-26  Consumer Information Use and Disclaimer   This information is not specific medical advice and does not replace information you receive from your health care provider. This is only a brief summary of general " information. It does NOT include all information about conditions, illnesses, injuries, tests, procedures, treatments, therapies, discharge instructions or life-style choices that may apply to you. You must talk with your health care provider for complete information about your health and treatment options. This information should not be used to decide whether or not to accept your health care providers advice, instructions or recommendations. Only your health care provider has the knowledge and training to provide advice that is right for you.  Copyright   Copyright © 2021 [employer] and its affiliates and/or licensors. All rights reserved.

## 2024-01-18 ENCOUNTER — OFFICE VISIT (OUTPATIENT)
Dept: DERMATOLOGY | Facility: CLINIC | Age: 39
End: 2024-01-18
Payer: COMMERCIAL

## 2024-01-18 VITALS — HEIGHT: 60 IN | BODY MASS INDEX: 42.73 KG/M2 | WEIGHT: 217.63 LBS

## 2024-01-18 DIAGNOSIS — L82.1 VERRUCOUS PAPULE: ICD-10-CM

## 2024-01-18 DIAGNOSIS — L71.0 PERIORAL DERMATITIS: ICD-10-CM

## 2024-01-18 DIAGNOSIS — L71.9 ROSACEA: Primary | ICD-10-CM

## 2024-01-18 PROCEDURE — 1159F MED LIST DOCD IN RCRD: CPT | Mod: CPTII,S$GLB,, | Performed by: PHYSICIAN ASSISTANT

## 2024-01-18 PROCEDURE — 99214 OFFICE O/P EST MOD 30 MIN: CPT | Mod: 25,S$GLB,, | Performed by: PHYSICIAN ASSISTANT

## 2024-01-18 PROCEDURE — 3008F BODY MASS INDEX DOCD: CPT | Mod: CPTII,S$GLB,, | Performed by: PHYSICIAN ASSISTANT

## 2024-01-18 PROCEDURE — 17110 DESTRUCTION B9 LES UP TO 14: CPT | Mod: S$GLB,,, | Performed by: PHYSICIAN ASSISTANT

## 2024-01-18 PROCEDURE — 1160F RVW MEDS BY RX/DR IN RCRD: CPT | Mod: CPTII,S$GLB,, | Performed by: PHYSICIAN ASSISTANT

## 2024-01-18 PROCEDURE — 99999 PR PBB SHADOW E&M-EST. PATIENT-LVL III: CPT | Mod: PBBFAC,,, | Performed by: PHYSICIAN ASSISTANT

## 2024-01-18 RX ORDER — BRIMONIDINE 5 MG/G
1 GEL TOPICAL DAILY
Qty: 30 G | Refills: 5 | Status: SHIPPED | OUTPATIENT
Start: 2024-01-18 | End: 2024-01-24 | Stop reason: SDUPTHER

## 2024-01-18 NOTE — PROGRESS NOTES
Subjective:      Patient ID:  Valerie Guillen is a 38 y.o. female who presents for   Chief Complaint   Patient presents with    Rash     F/u rash check     Hx of perioral dermatitis, verrucous papule of posterior leg, last seen 10/16/23.  Trial of metrocream 0.75% cream at last visit, she notes being very consistent w/application, but since holidays has lapsed a bit.  Believes to have overall improvement.   Still some concerns about some of the residual redness.             Review of Systems    Objective:   Physical Exam   Constitutional: She appears well-developed and well-nourished. No distress.   Neurological: She is alert and oriented to person, place, and time. She is not disoriented.   Psychiatric: She has a normal mood and affect.   Skin:   Areas Examined (abnormalities noted in diagram):   Head / Face Inspection Performed  Neck Inspection Performed  Chest / Axilla Inspection Performed  Back Inspection Performed  RUE Inspected  LUE Inspection Performed  LLE Inspection Performed                 Diagram Legend     Erythematous scaling macule/papule c/w actinic keratosis       Vascular papule c/w angioma      Pigmented verrucoid papule/plaque c/w seborrheic keratosis      Yellow umbilicated papule c/w sebaceous hyperplasia      Irregularly shaped tan macule c/w lentigo     1-2 mm smooth white papules consistent with Milia      Movable subcutaneous cyst with punctum c/w epidermal inclusion cyst      Subcutaneous movable cyst c/w pilar cyst      Firm pink to brown papule c/w dermatofibroma      Pedunculated fleshy papule(s) c/w skin tag(s)      Evenly pigmented macule c/w junctional nevus     Mildly variegated pigmented, slightly irregular-bordered macule c/w mildly atypical nevus      Flesh colored to evenly pigmented papule c/w intradermal nevus       Pink pearly papule/plaque c/w basal cell carcinoma      Erythematous hyperkeratotic cursted plaque c/w SCC      Surgical scar with no sign of skin cancer  recurrence      Open and closed comedones      Inflammatory papules and pustules      Verrucoid papule consistent consistent with wart     Erythematous eczematous patches and plaques     Dystrophic onycholytic nail with subungual debris c/w onychomycosis     Umbilicated papule    Erythematous-base heme-crusted tan verrucoid plaque consistent with inflamed seborrheic keratosis     Erythematous Silvery Scaling Plaque c/w Psoriasis     See annotation      Assessment / Plan:        Rosacea  -     brimonidine (MIRVASO) 0.33 % GlwP; Apply 1 application  topically once daily. For redness of rosacea.  Dispense: 30 g; Refill: 5  Trial of above rx as desired for redness. Advised it does not treat rosacea inflammation. Warned of rebound redness. Discussed 12 hour window of coverage. Continue metrocream bid.    Perioral dermatitis  Improved. Discussed risk of recurrence. Continue metrocream 0.75% bid prn.    Verrucous papule  Improved, agree to repeat trial of cryo today. Cryosurgery procedure note:    After risks, benefits, and alternatives discussed, including blistering, pain, scar, recurrence, allergy to anesthesia (if given), hyper- and hypopigmentation, verbal consent from the patient is obtained.  Liquid nitrogen cryosurgery is applied to 1 verruca, as detailed in the physical exam, to produce a freeze injury. 2 consecutive freeze thaw cycles are applied to each verruca. The patient is aware that blisters (possibly blood blisters) may form.         Follow up in about 4 months (around 5/18/2024) for rosacea.

## 2024-01-24 ENCOUNTER — PATIENT MESSAGE (OUTPATIENT)
Dept: DERMATOLOGY | Facility: CLINIC | Age: 39
End: 2024-01-24
Payer: COMMERCIAL

## 2024-01-24 DIAGNOSIS — L71.9 ROSACEA: ICD-10-CM

## 2024-01-24 RX ORDER — BRIMONIDINE 5 MG/G
1 GEL TOPICAL DAILY
Qty: 30 G | Refills: 5 | Status: SHIPPED | OUTPATIENT
Start: 2024-01-24

## 2024-01-24 NOTE — PROGRESS NOTES
Subjective:      Patient ID:  Valerie Guillen is a 38 y.o. female who presents for No chief complaint on file.    HPI    Review of Systems    Objective:   Physical Exam     Diagram Legend     Erythematous scaling macule/papule c/w actinic keratosis       Vascular papule c/w angioma      Pigmented verrucoid papule/plaque c/w seborrheic keratosis      Yellow umbilicated papule c/w sebaceous hyperplasia      Irregularly shaped tan macule c/w lentigo     1-2 mm smooth white papules consistent with Milia      Movable subcutaneous cyst with punctum c/w epidermal inclusion cyst      Subcutaneous movable cyst c/w pilar cyst      Firm pink to brown papule c/w dermatofibroma      Pedunculated fleshy papule(s) c/w skin tag(s)      Evenly pigmented macule c/w junctional nevus     Mildly variegated pigmented, slightly irregular-bordered macule c/w mildly atypical nevus      Flesh colored to evenly pigmented papule c/w intradermal nevus       Pink pearly papule/plaque c/w basal cell carcinoma      Erythematous hyperkeratotic cursted plaque c/w SCC      Surgical scar with no sign of skin cancer recurrence      Open and closed comedones      Inflammatory papules and pustules      Verrucoid papule consistent consistent with wart     Erythematous eczematous patches and plaques     Dystrophic onycholytic nail with subungual debris c/w onychomycosis     Umbilicated papule    Erythematous-base heme-crusted tan verrucoid plaque consistent with inflamed seborrheic keratosis     Erythematous Silvery Scaling Plaque c/w Psoriasis     See annotation      Assessment / Plan:        There are no diagnoses linked to this encounter.         No follow-ups on file.

## 2024-05-15 ENCOUNTER — TELEPHONE (OUTPATIENT)
Dept: DERMATOLOGY | Facility: CLINIC | Age: 39
End: 2024-05-15
Payer: COMMERCIAL

## 2024-05-15 NOTE — TELEPHONE ENCOUNTER
Called pt regarding rescheduling appointment for 5/20 with Delphine Roberts PA-C due to provider being out. Pt rescheduled for July 15 and appt added to wait list. Pt verbalized understanding.